# Patient Record
Sex: FEMALE | URBAN - METROPOLITAN AREA
[De-identification: names, ages, dates, MRNs, and addresses within clinical notes are randomized per-mention and may not be internally consistent; named-entity substitution may affect disease eponyms.]

---

## 2024-05-24 DIAGNOSIS — K58.1 IRRITABLE BOWEL SYNDROME WITH CONSTIPATION: Primary | ICD-10-CM

## 2025-02-05 ENCOUNTER — OFFICE VISIT (OUTPATIENT)
Dept: URGENT CARE | Age: 30
End: 2025-02-05
Payer: COMMERCIAL

## 2025-02-05 VITALS
DIASTOLIC BLOOD PRESSURE: 88 MMHG | HEART RATE: 81 BPM | RESPIRATION RATE: 16 BRPM | SYSTOLIC BLOOD PRESSURE: 145 MMHG | OXYGEN SATURATION: 99 %

## 2025-02-05 DIAGNOSIS — N94.9 VAGINAL DISCOMFORT: Primary | ICD-10-CM

## 2025-02-05 DIAGNOSIS — N89.8 VAGINAL DISCHARGE: ICD-10-CM

## 2025-02-05 LAB — POC BACTERIAL VAGINITIS (RAPID): NEGATIVE

## 2025-02-05 NOTE — PROGRESS NOTES
Subjective   Patient ID: Petr Medrano is a 29 y.o. female. They present today with a chief complaint of Vaginitis/Bacterial Vaginosis (C/O yeast infection for the last two and half weeks. ).    History of Present Illness  28 yo female coming in for discomfort to the external genitalia. She states [facility] and was told that she just had a yeast infection. She states her urine has been negative for UTI twice with negative culture. She states she has not had any discharge. She states all of the discomfort is at the base of the clitoris.     Past Medical History  Allergies as of 02/05/2025    (No Known Allergies)       (Not in a hospital admission)       History reviewed. No pertinent past medical history.    History reviewed. No pertinent surgical history.     reports that she has never smoked. She has never used smokeless tobacco.    Review of Systems  Review of Systems:  General: No weight loss, fatigue, anorexia, insomnia, fever, chills.  Cardiac: No chest pain, palpitations, syncope, near syncope.  Pulmonary:  No shortness of breath, cough, hemoptysis  Heme/lymph: No swollen glands, fever, bleeding  : No discharge, dysuria, frequency, urgency, hematuria. Positive discomfort to the outside of the vagina  Musculoskeletal: No limb pain, joint pain, joint swelling.  Skin: No rashes                                 Objective    Vitals:    02/05/25 1752   BP: 145/88   BP Location: Left arm   Patient Position: Sitting   BP Cuff Size: Adult   Pulse: 81   Resp: 16   SpO2: 99%     No LMP recorded.    Physical Exam  Female :  General: Vitals noted, no distress. Afebrile  Cardiac: Regular rate and rhythm, no murmur.   Pulmonary: Lungs clear bilaterally with good aeration. No adventitious breath sounds.   Pelvic Exam: External exam shows erythema and erythema just distal to the clitoris. There is what appears to be a small laceration to this area as well. No discharge is noted.   Skin:  No rash      Procedures    Point of Care Test & Imaging Results from this visit  Results for orders placed or performed in visit on 02/05/25   POCT BV Blue Rapid - Bacterial Vaginitis manually resulted   Result Value Ref Range    POC Bacterial Vaginitis (Rapid) Negative Negative      No results found.    Diagnostic study results (if any) were reviewed by SOO Sams.    Assessment/Plan   Allergies, medications, history, and pertinent labs/EKGs/Imaging reviewed by SOO Sams.     Medical Decision Making  Testing: rapid BV, yeast, hsv  Treatment: Patient advised to take her valacyclovir 2 times daily until the results of the testing are back.   Differential: 1) vaginitis , 2) hsv, 3) vaginal discomfort  Plan: Patient will follow up with the PCP in the next 2-3 days. Return for any worsening symptoms or go to the ER for further evaluation. Patient understands return precautions and discharge insturctions.  Impression:   1) vaginal discomfort      Orders and Diagnoses  Diagnoses and all orders for this visit:  Vaginal discomfort  -     HSV PCR, Skin/Mucosa  Vaginal discharge  -     POCT BV Blue Rapid - Bacterial Vaginitis manually resulted  -     Vaginitis Gram Stain For Bacterial Vaginosis + Yeast      Medical Admin Record      Patient disposition: Home    Electronically signed by SOO Sams  6:30 PM

## 2025-02-06 LAB — BV SCORE VAG QL: NORMAL

## 2025-02-09 LAB
HSV1 DNA SPEC QL NAA+PROBE: NOT DETECTED
HSV2 DNA SPEC QL NAA+PROBE: NOT DETECTED
SPECIMEN SOURCE: NORMAL